# Patient Record
Sex: FEMALE | Race: WHITE | NOT HISPANIC OR LATINO | URBAN - METROPOLITAN AREA
[De-identification: names, ages, dates, MRNs, and addresses within clinical notes are randomized per-mention and may not be internally consistent; named-entity substitution may affect disease eponyms.]

---

## 2018-06-14 ENCOUNTER — IMPORTED ENCOUNTER (OUTPATIENT)
Dept: URBAN - METROPOLITAN AREA CLINIC 31 | Facility: CLINIC | Age: 83
End: 2018-06-14

## 2018-06-14 PROBLEM — H25.813: Noted: 2018-06-14

## 2018-06-14 PROBLEM — H35.373: Noted: 2018-06-14

## 2018-06-14 PROCEDURE — 92250 FUNDUS PHOTOGRAPHY W/I&R: CPT

## 2018-06-14 PROCEDURE — 92014 COMPRE OPH EXAM EST PT 1/>: CPT

## 2018-06-14 PROCEDURE — 92015 DETERMINE REFRACTIVE STATE: CPT

## 2018-06-14 NOTE — PATIENT DISCUSSION
1. Combined Types of Cataract OU: Explained how cataracts can effect vision. Discussed degree of cataracts and myopic shift. Wants to hold on consult at this time. 2. Epiretinal Membrane OU - monitor3. Refractive error - Change glasses. 4.  Return for an appointment in 6 months for comprehensive exam. OCT Macula. with Dr. Shira Campbell.

## 2018-12-06 ENCOUNTER — IMPORTED ENCOUNTER (OUTPATIENT)
Dept: URBAN - METROPOLITAN AREA CLINIC 31 | Facility: CLINIC | Age: 83
End: 2018-12-06

## 2018-12-06 PROBLEM — H25.813: Noted: 2018-12-06

## 2018-12-06 PROBLEM — H35.373: Noted: 2018-12-06

## 2018-12-06 PROCEDURE — 92014 COMPRE OPH EXAM EST PT 1/>: CPT

## 2018-12-06 PROCEDURE — 92134 CPTRZ OPH DX IMG PST SGM RTA: CPT

## 2018-12-06 NOTE — PATIENT DISCUSSION
1. Combined Types of Cataract OU: Explained how cataracts can effect vision. Discussed degree of cataracts and myopic shift. 2.  Epiretinal Membrane OU - stable monitor3. Refractive error - no glasses change. 5.  Hypertensive retinopathy OD superior - stable monitor4. Return for an appointment in 6 months for comprehensive exam OCT Macula. with Dr. Lucero Antonio

## 2019-03-07 NOTE — PROCEDURE NOTE: CLINICAL
PROCEDURE NOTE: Excision Chalazion, Single Left Lower Lid. Diagnosis: Chalazion. Prior to treatment, the risks/benefits/alternatives were discussed. The patient wished to proceed with procedure. After the risks, benefits, and alternatives to the procedure were discussed with the patient, informed consent was obtained. The patient, the procedure, and the correct site were identified beforehand. Local anesthesia was applied and the lid was prepped. The lid was clamped exposing the posterior surface on the eyelid. The chalazion was incised and removed with a curette. Bleeding was controlled and the clamp was removed. The patient tolerated the procedure well and left the room in good condition. Post procedure instructions given. Mitch Nunez PROCEDURE NOTE: Chalazion Injection Left Lower Lid. Diagnosis: Chalazion. Prior to treatment, the risks/benefits/alternatives were discussed. The patient wished to proceed with procedure. Site of Injection: *. Kenalog *units injected. Patient tolerated procedure well. There were no complications. Post procedure instructions given. Mitch Nunez

## 2019-12-11 ENCOUNTER — IMPORTED ENCOUNTER (OUTPATIENT)
Dept: URBAN - METROPOLITAN AREA CLINIC 31 | Facility: CLINIC | Age: 84
End: 2019-12-11

## 2019-12-11 PROBLEM — H25.13: Noted: 2019-12-11

## 2019-12-11 PROBLEM — H34.8312: Noted: 2019-12-11

## 2019-12-11 PROCEDURE — 92014 COMPRE OPH EXAM EST PT 1/>: CPT

## 2019-12-11 PROCEDURE — 92015 DETERMINE REFRACTIVE STATE: CPT

## 2019-12-11 PROCEDURE — 92134 CPTRZ OPH DX IMG PST SGM RTA: CPT

## 2019-12-11 NOTE — PATIENT DISCUSSION
1.  Cataract consult OU. 2. BRVO OD - longstanding stable. 3. Return for an appointment in 1 month for cataract evaluation. with Dr. Celestino Buerger.

## 2020-02-26 ENCOUNTER — IMPORTED ENCOUNTER (OUTPATIENT)
Dept: URBAN - METROPOLITAN AREA CLINIC 31 | Facility: CLINIC | Age: 85
End: 2020-02-26

## 2020-02-26 PROBLEM — H34.8312: Noted: 2020-02-26

## 2020-02-26 PROBLEM — H35.373: Noted: 2020-02-26

## 2020-02-26 PROBLEM — H25.13: Noted: 2020-02-26

## 2020-02-26 PROCEDURE — 99213 OFFICE O/P EST LOW 20 MIN: CPT

## 2020-02-26 NOTE — PATIENT DISCUSSION
1.  Discussed the risks benefits alternatives and limitations of cataract surgery including infection bleeding loss of vision retinal tears detachment. The patient stated a full understanding and a desire to proceed with the procedure in both eyes. Refractive options were reviewed. Patient has elected to be optimized for reading vision in both eyes. The patient will still need glasses for distance. Schedule KPE/IOL OS/OD. Pore  -2.50 po DextenzaReturn for an appointment for Admit. with Dr. Lynn Whitehead. 2.  BRVO OD - OLD stable monitor hypertensive changes stable3. Epiretinal Membrane OU - mild stable4. Return for an appointment for [address] [place] [address] with Dr. Lynn Whitehead.

## 2020-10-12 ENCOUNTER — IMPORTED ENCOUNTER (OUTPATIENT)
Dept: URBAN - METROPOLITAN AREA CLINIC 31 | Facility: CLINIC | Age: 85
End: 2020-10-12

## 2020-10-12 PROBLEM — H25.13: Noted: 2020-10-12

## 2020-10-12 PROBLEM — H35.373: Noted: 2020-10-12

## 2020-10-12 PROBLEM — H34.8312: Noted: 2020-10-12

## 2020-10-12 PROCEDURE — 92136 OPHTHALMIC BIOMETRY: CPT

## 2020-10-12 PROCEDURE — 99214 OFFICE O/P EST MOD 30 MIN: CPT

## 2020-10-12 NOTE — PATIENT DISCUSSION
1.  Discussed the risks benefits alternatives and limitations of cataract surgery including infection bleeding loss of vision retinal tears detachment. The patient stated a full understanding and a desire to proceed with the procedure in both eyes. Refractive options were reviewed. Patient has elected to be optimized for reading vision in both eyes. The patient will still need glasses for distance. Schedule KPE/IOL OS/OD. Pore  -2.50 po Dextenza2. BRVO OD - OLD stable monitor hypertensive changes stable3.   Epiretinal Membrane OU - mild stable

## 2020-10-12 NOTE — PATIENT DISCUSSION
Discussed the risks benefits alternatives and limitations of cataract surgery including infection bleeding loss of vision retinal tears detachment. The patient stated a full understanding and a desire to proceed with the procedure in both eyes. Refractive options were reviewed. Patient has elected to be optimized for near vision in both eyes. The patient will still need glasses for reading for distance vision.

## 2021-03-23 ENCOUNTER — IMPORTED ENCOUNTER (OUTPATIENT)
Dept: URBAN - METROPOLITAN AREA CLINIC 31 | Facility: CLINIC | Age: 86
End: 2021-03-23

## 2021-03-23 PROBLEM — H34.8312: Noted: 2021-03-23

## 2021-03-23 PROBLEM — H25.813: Noted: 2021-03-23

## 2021-03-23 PROBLEM — H35.373: Noted: 2021-03-23

## 2021-03-23 PROCEDURE — 99214 OFFICE O/P EST MOD 30 MIN: CPT

## 2021-03-23 NOTE — PATIENT DISCUSSION
1.  Discussed the risks benefits alternatives and limitations of cataract surgery including infection bleeding loss of vision retinal tears detachment. The patient stated a full understanding and a desire to proceed with the procedure in both eyes. Refractive options were reviewed. Patient has elected to be optimized for near vision in both eyes. The patient will still need glasses for distance. Radha Burton. Patient was in office today to complete paperwork for surgery. 3. BRVO OD - old stable4.   Epiretinal Membrane OU - mild monitor

## 2021-04-13 ENCOUNTER — IMPORTED ENCOUNTER (OUTPATIENT)
Dept: URBAN - METROPOLITAN AREA CLINIC 31 | Facility: CLINIC | Age: 86
End: 2021-04-13

## 2021-04-13 PROBLEM — Z96.1: Noted: 2021-04-13

## 2021-04-13 PROCEDURE — 99024 POSTOP FOLLOW-UP VISIT: CPT

## 2021-04-13 NOTE — PATIENT DISCUSSION
1.  Post-Op Day #1 - Cataract Surgery Left Eye (OS) - doing well. Tears prn. Continue postop drops as directed. Call office with symptoms of pain redness or decreased vision in operative eye.  1 drop of tobramycin instilled2. Return for an appointment in 1 week for comprehensive exam. with Dr. Milagro Lea.

## 2021-04-20 ENCOUNTER — IMPORTED ENCOUNTER (OUTPATIENT)
Dept: URBAN - METROPOLITAN AREA CLINIC 31 | Facility: CLINIC | Age: 86
End: 2021-04-20

## 2021-04-20 PROBLEM — Z96.1: Noted: 2021-04-20

## 2021-04-20 PROCEDURE — 99024 POSTOP FOLLOW-UP VISIT: CPT

## 2021-04-20 NOTE — PATIENT DISCUSSION
1.  1.  Post-Op Day #1 - Cataract Surgery Right Eye (OD) - doing well. Tears prn. Continue postop drops as directed. Call office with symptoms of pain redness or decreased vision in operative eye. 1 drop tobramycin instilled. 1 gt Travatan Z instilled. 2. Post-Op Week #1 - Cataract Surgery Left Eye (OS) -  Intraocular lens stable and surgery very well healed. Patient to resume all normal activities. Finish postop drops as directed. Call with any problems. 3. Return for an appointment in 1 week for post op exam. with Dr. Florinda Severino.

## 2021-04-27 ENCOUNTER — IMPORTED ENCOUNTER (OUTPATIENT)
Dept: URBAN - METROPOLITAN AREA CLINIC 31 | Facility: CLINIC | Age: 86
End: 2021-04-27

## 2021-04-27 PROBLEM — Z96.1: Noted: 2021-04-27

## 2021-04-27 PROCEDURE — 99024 POSTOP FOLLOW-UP VISIT: CPT

## 2021-04-27 NOTE — PATIENT DISCUSSION
1.  Post-Op Week #1 - Cataract Surgery Right Eye (OD) - Intraocular lens stable and surgery very well healed. Patient to resume all normal activities. Finish postop drops as directed. Update glasses. Call with any problems. 2. Post-Op Week #2 - Cataract Surgery Left Eye (OS) -  Intraocular lens stable and surgery very well healed. Patient to resume all normal activities. Finish postop drops as directed. 3.  Return for an appointment in 4 months for dilated fundus exam. with Dr. Rosemary Perez.

## 2021-05-14 ENCOUNTER — IMPORTED ENCOUNTER (OUTPATIENT)
Dept: URBAN - METROPOLITAN AREA CLINIC 31 | Facility: CLINIC | Age: 86
End: 2021-05-14

## 2021-05-14 PROBLEM — Z96.1: Noted: 2021-05-14

## 2021-05-14 PROCEDURE — 99024 POSTOP FOLLOW-UP VISIT: CPT

## 2021-05-14 NOTE — PATIENT DISCUSSION
1.  Post-Op Cataract Surgery 15-90 days Both Eyes (OU)-  Doing well with stable vision. Monitor for changes. Donig well. Taper gtt and use HS X 1W then DC. 2. Return for an appointment in 4 months for dilated fundus exam. with Dr. Florinda Severino.

## 2021-08-31 ENCOUNTER — IMPORTED ENCOUNTER (OUTPATIENT)
Dept: URBAN - METROPOLITAN AREA CLINIC 31 | Facility: CLINIC | Age: 86
End: 2021-08-31

## 2021-08-31 PROBLEM — H35.373: Noted: 2021-08-31

## 2021-08-31 PROBLEM — Z96.1: Noted: 2021-08-31

## 2021-08-31 PROCEDURE — 99214 OFFICE O/P EST MOD 30 MIN: CPT

## 2021-08-31 PROCEDURE — 92134 CPTRZ OPH DX IMG PST SGM RTA: CPT

## 2021-08-31 NOTE — PATIENT DISCUSSION
1.  Epiretinal Membrane OS>>OD - Monitor. Refer to Dr. Norma Lundy if any progression. 2. Pseudophakia OU - IOLs stable. Monitor for changes in vision. 3. Return for an appointment in 5 months for dilated fundus exam and OCT macula with Dr. Sophia Horowitz.

## 2022-01-25 ENCOUNTER — IMPORTED ENCOUNTER (OUTPATIENT)
Dept: URBAN - METROPOLITAN AREA CLINIC 31 | Facility: CLINIC | Age: 87
End: 2022-01-25

## 2022-01-25 PROBLEM — H35.373: Noted: 2022-01-25

## 2022-01-25 PROBLEM — Z96.1: Noted: 2022-01-25

## 2022-01-25 PROCEDURE — 92134 CPTRZ OPH DX IMG PST SGM RTA: CPT

## 2022-01-25 PROCEDURE — 99214 OFFICE O/P EST MOD 30 MIN: CPT

## 2022-01-25 NOTE — PATIENT DISCUSSION
1.  Epiretinal Membrane OS>>OD - Monitor. Refer to Dr. Jalyn Bob if any progression. 2. Pseudophakia OU - IOLs stable. Monitor for changes in vision. 3.   Return for an appointment in 8 months for complete exam and macula OCT with Dr. Winslow Most

## 2022-04-02 ASSESSMENT — VISUAL ACUITY
OD_GLARE: 20/70MED
OD_CC: 20/200
OS_SC: 20/25
OS_CC: 20/100
OD_PH: CC 20/40 +2
OS_SC: 20/40
OD_PH: 20/40
OD_SC: J3
OD_CC: 20/70
OD_SC: 20/25-1
OD_CC: 20/70
OD_PH: SC 20/80
OD_PH: CC 20/30
OS_SC: 20/40-1
OS_SC: 20/60
OS_CC: 20/400
OS_CC: 20/250
OS_PH: SC 20/50
OD_CC: 20/300
OS_SC: 20/25
OD_PH: SC 20/40 -1
OU_CC: 20/80
OD_SC: 20/70-2
OS_PH: CC 20/50
OS_SC: J10
OU_SC: 20/20
OD_CC: J1+14''
OD_CC: 20/200
OU_CC: 20/30
OS_CC: 20/100-1
OD_SC: 20/70-1
OD_SC: 20/20-2
OS_CC: J114''
OD_PH: CC 20/40 +2
OS_CC: 20/250
OD_SC: 20/50
OS_SC: 20/60+2
OS_GLARE: 20/80MED
OS_CC: 20/400
OD_CC: 20/200
OD_SC: 20/30+2
OS_PH: CC 20/40 +1
OS_SC: 20/25-2
OD_SC: 20/25
OD_CC: 20/300-1
OS_PH: SC 20/50 +2
OS_SC: 20/40-2
OD_SC: 20/80
OS_SC: 20/40
OD_SC: 20/30

## 2022-04-02 ASSESSMENT — TONOMETRY
OS_IOP_MMHG: 10
OD_IOP_MMHG: 14
OS_IOP_MMHG: 12
OD_IOP_MMHG: 14
OD_IOP_MMHG: 12
OS_IOP_MMHG: 15
OD_IOP_MMHG: 28
OS_IOP_MMHG: 14
OD_IOP_MMHG: 11
OD_IOP_MMHG: 14
OD_IOP_MMHG: 12
OS_IOP_MMHG: 14
OS_IOP_MMHG: 15
OS_IOP_MMHG: 14
OD_IOP_MMHG: 15
OD_IOP_MMHG: 17
OS_IOP_MMHG: 19
OS_IOP_MMHG: 16
OS_IOP_MMHG: 14

## 2022-10-11 ENCOUNTER — COMPREHENSIVE EXAM (OUTPATIENT)
Dept: URBAN - METROPOLITAN AREA CLINIC 29 | Facility: CLINIC | Age: 87
End: 2022-10-11

## 2022-10-11 DIAGNOSIS — Z96.1: ICD-10-CM

## 2022-10-11 DIAGNOSIS — H35.373: ICD-10-CM

## 2022-10-11 DIAGNOSIS — G43.B0: ICD-10-CM

## 2022-10-11 PROCEDURE — 99214 OFFICE O/P EST MOD 30 MIN: CPT

## 2022-10-11 ASSESSMENT — VISUAL ACUITY
OD_CC: 20/30
OS_CC: 20/40
OD_CC: 20/30-2
OS_CC: 20/25
OS_PH: 20/30-2
OD_PH: 20/20-1

## 2022-10-11 ASSESSMENT — TONOMETRY
OD_IOP_MMHG: 10
OS_IOP_MMHG: 10

## 2023-10-17 ENCOUNTER — COMPREHENSIVE EXAM (OUTPATIENT)
Dept: URBAN - METROPOLITAN AREA CLINIC 29 | Facility: CLINIC | Age: 88
End: 2023-10-17

## 2023-10-17 DIAGNOSIS — Z96.1: ICD-10-CM

## 2023-10-17 DIAGNOSIS — H35.373: ICD-10-CM

## 2023-10-17 DIAGNOSIS — G43.B0: ICD-10-CM

## 2023-10-17 DIAGNOSIS — H26.493: ICD-10-CM

## 2023-10-17 PROCEDURE — 99214 OFFICE O/P EST MOD 30 MIN: CPT

## 2023-10-17 ASSESSMENT — TONOMETRY
OD_IOP_MMHG: 10
OS_IOP_MMHG: 10

## 2023-10-17 ASSESSMENT — VISUAL ACUITY
OD_CC: 20/20
OS_CC: 20/20
OD_CC: 20/30-2

## 2024-11-05 ENCOUNTER — COMPREHENSIVE EXAM (OUTPATIENT)
Dept: URBAN - METROPOLITAN AREA CLINIC 29 | Facility: CLINIC | Age: 89
End: 2024-11-05

## 2024-11-05 DIAGNOSIS — G43.B0: ICD-10-CM

## 2024-11-05 DIAGNOSIS — H10.13: ICD-10-CM

## 2024-11-05 DIAGNOSIS — H52.13: ICD-10-CM

## 2024-11-05 DIAGNOSIS — H35.373: ICD-10-CM

## 2024-11-05 DIAGNOSIS — H26.493: ICD-10-CM

## 2024-11-05 DIAGNOSIS — H52.4: ICD-10-CM

## 2024-11-05 DIAGNOSIS — H52.223: ICD-10-CM

## 2024-11-05 DIAGNOSIS — Z96.1: ICD-10-CM

## 2024-11-05 PROCEDURE — 92014 COMPRE OPH EXAM EST PT 1/>: CPT

## 2024-11-05 PROCEDURE — 92134 CPTRZ OPH DX IMG PST SGM RTA: CPT

## 2024-11-05 PROCEDURE — 92015 DETERMINE REFRACTIVE STATE: CPT | Mod: NC

## 2025-05-27 ENCOUNTER — COMPREHENSIVE EXAM (OUTPATIENT)
Age: 89
End: 2025-05-27

## 2025-05-27 DIAGNOSIS — H10.13: ICD-10-CM

## 2025-05-27 DIAGNOSIS — H26.493: ICD-10-CM

## 2025-05-27 DIAGNOSIS — Z96.1: ICD-10-CM

## 2025-05-27 DIAGNOSIS — H35.373: ICD-10-CM

## 2025-05-27 DIAGNOSIS — G43.B0: ICD-10-CM

## 2025-05-27 PROCEDURE — 99214 OFFICE O/P EST MOD 30 MIN: CPT

## 2025-05-27 PROCEDURE — 92134 CPTRZ OPH DX IMG PST SGM RTA: CPT
